# Patient Record
Sex: MALE | Race: WHITE | NOT HISPANIC OR LATINO
[De-identification: names, ages, dates, MRNs, and addresses within clinical notes are randomized per-mention and may not be internally consistent; named-entity substitution may affect disease eponyms.]

---

## 2020-02-21 ENCOUNTER — APPOINTMENT (OUTPATIENT)
Dept: UROLOGY | Facility: CLINIC | Age: 60
End: 2020-02-21
Payer: COMMERCIAL

## 2020-02-21 ENCOUNTER — TRANSCRIPTION ENCOUNTER (OUTPATIENT)
Age: 60
End: 2020-02-21

## 2020-02-21 VITALS
HEIGHT: 70 IN | SYSTOLIC BLOOD PRESSURE: 145 MMHG | TEMPERATURE: 98.1 F | DIASTOLIC BLOOD PRESSURE: 80 MMHG | BODY MASS INDEX: 25.77 KG/M2 | WEIGHT: 180 LBS

## 2020-02-21 DIAGNOSIS — Z00.00 ENCOUNTER FOR GENERAL ADULT MEDICAL EXAMINATION W/OUT ABNORMAL FINDINGS: ICD-10-CM

## 2020-02-21 LAB
BILIRUB UR QL STRIP: NORMAL
CLARITY UR: CLEAR
COLLECTION METHOD: NORMAL
GLUCOSE UR-MCNC: NORMAL
HCG UR QL: 0.2 EU/DL
HGB UR QL STRIP.AUTO: NORMAL
KETONES UR-MCNC: NORMAL
LEUKOCYTE ESTERASE UR QL STRIP: NORMAL
NITRITE UR QL STRIP: NORMAL
PH UR STRIP: 7
PROT UR STRIP-MCNC: NORMAL
SP GR UR STRIP: 1.01

## 2020-02-21 PROCEDURE — 81003 URINALYSIS AUTO W/O SCOPE: CPT | Mod: QW

## 2020-02-21 PROCEDURE — 99204 OFFICE O/P NEW MOD 45 MIN: CPT

## 2020-02-21 NOTE — HISTORY OF PRESENT ILLNESS
[FreeTextEntry1] : 59 year old male who comes to see me today as a new patient. He has a family history of CaP. He has little to no LUTS, and was last see by an Urologist in 2014 (Dr. Galvan). At that visit he also complained of ED and was treated with Viagra 100 mg. He apparently had a PSA level drawn 6 months ago which he recalls was "normal" but the result is not available today. \par \par This patient tells me that the Viagra has not been working well over the last year, but he does not accurately follow the directions of taking the medication on an empty stomach. The patient also tells me that he has developed Premature Ejaculation and asks if there is treatment for this problem.\par \par Patient also tells me that he is under the care of Dr. Soriano for his heart and for some cardiac calcification. He denies any angina or the use of any nitrate medication.\par \par UA dipstick negative today.

## 2020-02-21 NOTE — LETTER BODY
[Please see my note below.] : Please see my note below. [Consult Letter:] : I had the pleasure of evaluating your patient, [unfilled]. [Dear  ___] : Dear  [unfilled], [Consult Closing:] : Thank you very much for allowing me to participate in the care of this patient.  If you have any questions, please do not hesitate to contact me. [FreeTextEntry3] : Best Regards, \par \par Tiffanie Rea MD\par

## 2020-02-21 NOTE — PHYSICAL EXAM
[General Appearance - Well Developed] : well developed [General Appearance - Well Nourished] : well nourished [Normal Appearance] : normal appearance [Edema] : no peripheral edema [General Appearance - In No Acute Distress] : no acute distress [Well Groomed] : well groomed [Respiration, Rhythm And Depth] : normal respiratory rhythm and effort [Exaggerated Use Of Accessory Muscles For Inspiration] : no accessory muscle use [Abdomen Soft] : soft [Abdomen Hernia] : no hernia was discovered [Abdomen Tenderness] : non-tender [Costovertebral Angle Tenderness] : no ~M costovertebral angle tenderness [Penis Abnormality] : normal uncircumcised penis [Urethral Meatus] : meatus normal [Scrotum] : the scrotum was normal [Urinary Bladder Findings] : the bladder was normal on palpation [Epididymis] : the epididymides were normal [Prostate Enlargement] : the prostate was not enlarged [Testes Mass (___cm)] : there were no testicular masses [Testes Tenderness] : no tenderness of the testes [No Prostate Nodules] : no prostate nodules [Prostate Tenderness] : the prostate was not tender [Prostate Size ___ (0-4)] : prostate size [unfilled] (scale: 0-4) [Normal Station and Gait] : the gait and station were normal for the patient's age [] : no rash [No Focal Deficits] : no focal deficits [Oriented To Time, Place, And Person] : oriented to person, place, and time [Affect] : the affect was normal [Not Anxious] : not anxious [Mood] : the mood was normal

## 2020-02-21 NOTE — ASSESSMENT
[FreeTextEntry1] : We had an extensive discussion about the correct use of sildenafil, the alternative drugs available, further testing with Duples penile US testing and intracavernous injection (done in my office if indicated), and the association between CAD and ED as both are the result of small vessel disease. I recommended that he again try the sildenafil properly and I eRxd the medicine for him at [facility]. If this is not successful, then we will proceed as indicated. \par \par We also discussed his P.E. and I recommended that we delay further evaluation until we get the erections to function better. In the meanwhile, he can try second ejaculation. \par \par We also discussed his family history of CaP and I recommended that he see me yearly fir this.\par \par Tiffanie Rea MD\par

## 2020-02-24 LAB
PSA FREE FLD-MCNC: 31 %
PSA FREE SERPL-MCNC: 0.54 NG/ML
PSA SERPL-MCNC: 1.75 NG/ML
TESTOST SERPL-MCNC: 450 NG/DL

## 2020-11-02 ENCOUNTER — APPOINTMENT (OUTPATIENT)
Dept: UROLOGY | Facility: CLINIC | Age: 60
End: 2020-11-02
Payer: COMMERCIAL

## 2020-11-02 PROCEDURE — 99215 OFFICE O/P EST HI 40 MIN: CPT | Mod: 95

## 2020-11-02 NOTE — ASSESSMENT
[FreeTextEntry1] : We had an extensive discussion of the varied etiologies of ED and PE, including physical, medical and psychological. These issues appeared to be very difficult for the patient to discuss, and he and his wife were very appropriately concerned over them. They were dealing with feelings of guilt and blame and were both very respectful of and towards each other. They appeared to have moderately good insight into their situation, and both seemed motivated to work toward improving it. We discussed the optimal way to use the sildenafil and appropriate end point expectations. We also discussed the potential effects of EtOH on sexual performance at various levels of ingestion. We discussed second ejaculation for the ED as an immediate treatment that did not require any medication. We also discussed adding sertraline to his regimen and the basis for this addition. He declined this offer at this time, opting for non-medicine treatments. we also discussed sex therapy for the couple and potential benefits and they will explore this alternative also. Lastly we discussed the benefits of healthy diet, exercise, continued elimination of smoking and EtOH in moderation not only on ED and PE, but also on long term health on general.\par \par As for the urination issue, I confirmed that he was urinating normally and no further evaluation was indicated for this issue presently.\par \par We also discussed repeating the PSA and FU visit in 3 months. We ended the video portion of the visit at 1:43 PM. Tiffanei Rea MD\par

## 2020-11-02 NOTE — LETTER BODY
[Dear  ___] : Dear ~LYNDSEY, [Please see my note below.] : Please see my note below. [Consult Closing:] : Thank you very much for allowing me to participate in the care of this patient.  If you have any questions, please do not hesitate to contact me. [FreeTextEntry2] : Kj Gonzalez MD [FreeTextEntry1] : I had the pleasure of evaluation of your patient today via a telehealth virtual visit.\par  [FreeTextEntry3] : Best personal regards,\par \par Tiffanie\par

## 2020-11-02 NOTE — HISTORY OF PRESENT ILLNESS
[Other Location: e.g. School (Enter Location, City,State)___] : at [unfilled], at the time of the visit. [Other Location: e.g. Home (Enter Location, City,State)___] : at [unfilled] [Spouse] : spouse [Verbal consent obtained from patient] : the patient, [unfilled] [FreeTextEntry1] :  The patient-doctor relationship has been established in a face to face fashion via real time video/audio HIPAA compliant communication using telemedicine software. The patient's identity has been confirmed. The patient was previously emailed a copy of the telemedicine consent. They have had a chance to review and has now given verbal consent and has requested care to be assessed and treated through telemedicine and understands there maybe limitations in this process and they may need further follow up care in the office and or hospital settings. \par Verbal consent given on 11/2/2020, at 1:06 PM, by Angela Villegas.\par \par 59 year old male seen on 2/21/2020 as a new patient. He has a family history of CaP. He has little to no LUTS, and was last see by an Urologist in 2014 (Dr. Galvan). At that visit he also complained of ED and was treated with Viagra 100 mg. He apparently had a PSA level drawn 6 months prior which he recalls was "normal" but the result is not available. He had a PSA and TT done on 2/24/2020 which were both normal at 1.75 (31%) and 450 respectively. \par \par The patient is seen today with his wife for these issues. He tells me that he does not urinate unless his bladder is full and his wife is concerned over this. He denies any dysuria, frequency, urgency, hematuria, voids with a good stream and has nocturia x 0  2 if he drinks before sleeping. He feels like the sildenafil does not always work and his P.E. seems to be the biggest issue for him and his wife. He tells me that he has not smoked in about 1 year but continues to drink 2 glasses of whiskey or 1/2 bottle of wine on most nights. \par \par Patient also tells me that he is under the care of Dr. Soriano for his heart and for some cardiac calcification. He denies any angina or the use of any nitrate medication. HIs HTN and cholesterol issues are stabe on medicine, NKMA. \par \par  The patient denies fevers, chills, nausea and or vomiting and no unexplained weight loss. \par All pertinent parts of the patient PFSH (past medical, family and social histories), laboratory, radiological studies and physician notes were reviewed prior to starting the face to face portion of the telemedicine visit. Questionnaire results were discussed with patient.\par \par \par

## 2021-02-18 ENCOUNTER — NON-APPOINTMENT (OUTPATIENT)
Age: 61
End: 2021-02-18

## 2021-12-11 ENCOUNTER — NON-APPOINTMENT (OUTPATIENT)
Age: 61
End: 2021-12-11

## 2021-12-27 ENCOUNTER — RESULT CHARGE (OUTPATIENT)
Age: 61
End: 2021-12-27

## 2021-12-29 ENCOUNTER — APPOINTMENT (OUTPATIENT)
Dept: HEART AND VASCULAR | Facility: CLINIC | Age: 61
End: 2021-12-29

## 2021-12-29 VITALS
BODY MASS INDEX: 25.05 KG/M2 | TEMPERATURE: 97.3 F | WEIGHT: 175 LBS | DIASTOLIC BLOOD PRESSURE: 72 MMHG | HEART RATE: 53 BPM | HEIGHT: 70 IN | SYSTOLIC BLOOD PRESSURE: 142 MMHG

## 2022-01-12 ENCOUNTER — NON-APPOINTMENT (OUTPATIENT)
Age: 62
End: 2022-01-12

## 2022-01-12 ENCOUNTER — APPOINTMENT (OUTPATIENT)
Dept: HEART AND VASCULAR | Facility: CLINIC | Age: 62
End: 2022-01-12
Payer: COMMERCIAL

## 2022-01-12 VITALS
OXYGEN SATURATION: 94 % | DIASTOLIC BLOOD PRESSURE: 92 MMHG | BODY MASS INDEX: 25.91 KG/M2 | HEART RATE: 64 BPM | SYSTOLIC BLOOD PRESSURE: 148 MMHG | HEIGHT: 70 IN | WEIGHT: 181 LBS

## 2022-01-12 VITALS — SYSTOLIC BLOOD PRESSURE: 152 MMHG | DIASTOLIC BLOOD PRESSURE: 92 MMHG

## 2022-01-12 DIAGNOSIS — F52.4 PREMATURE EJACULATION: ICD-10-CM

## 2022-01-12 DIAGNOSIS — Z78.9 OTHER SPECIFIED HEALTH STATUS: ICD-10-CM

## 2022-01-12 DIAGNOSIS — Z87.891 PERSONAL HISTORY OF NICOTINE DEPENDENCE: ICD-10-CM

## 2022-01-12 PROCEDURE — 99204 OFFICE O/P NEW MOD 45 MIN: CPT | Mod: 25

## 2022-01-12 PROCEDURE — 93000 ELECTROCARDIOGRAM COMPLETE: CPT

## 2022-01-12 NOTE — HISTORY OF PRESENT ILLNESS
[FreeTextEntry1] : Mr. Villegas presents for initial evaluation and management of HTN, erectile dysfunction, dyslipidemia, and PAD.  He was previously followed by another cardiologist, Kathy Soriano MD who retired.  He has a family history of abdominal aortic aneurysm.  He has been getting regular screening sonograms of his carotid arteries, lower extremity peripheral arteries, and abdominal aorta.  He denies chest pain and had HOLLY to several flights of stairs.

## 2022-01-12 NOTE — REASON FOR VISIT
[Other: ____] : [unfilled] [FreeTextEntry1] : Diagnostic Tests:\par -------------------------------------------\par ECG:\[facility] 01/12/22: sinus bradycardia at 58 bpm, otherwise normal.  \par -------------------------------------------\par Echo:\[facility] 01/15/18: EF 64%, grade I diastolic dysfunction. \par -------------------------------------------\par Monitors:\[facility] 11/08/17: Holter: HR 46/64/103, first degree AV block, no pauses, no AF, 6 beats SVT, rare APCs/VPCs.  \par -------------------------------------------\par Carotid arteries:\par 11/08/17: sono: normal study.  \par 06/10/16: sono: mild atherosclerosis.\par -------------------------------------------\par Aorta-Iliac arteries:\par 11/08/17: sono: normal study. \par 06/10/16: sono: no AAA, mild atherosclerosis. \par --------------------------------------------\par Sleep studies:\par 11/13/17: no sleep apnea.

## 2022-01-12 NOTE — ASSESSMENT
[FreeTextEntry1] : 1. HTN: BP not at ACC/AHA 2017 guideline target: has been taking ramipril 2.5mg qhs\par         - change ramipril from 2.5mg po qhs to 5mg po qam \par         - if BP remains above target next visit will up titrate anti-HTN regimen \par         - patient will provide copy of recent lab work from PMD's office for my review \par         - check lab work next visit\par         - will send for an echocardiogram to rule out hypertensive heart disease\par \par 2. Dyslipidemia: LDL 63 (02/19/21)\par        - continue atorvastatin 40mg po qd\par        - discussed therapeutic lifestyle changes to promote improved lipid metabolism  \par        - check lab work next visit\par \par 3. PAD: per history, details unknown\par        - will send for a bilateral lower extremity arterial sonogram to assess extend of PAD\par \par 4. Carotid artery atherosclerosis:\par        - will send for a bilateral carotid artery sonogram to assess the extent of carotid atherosclerosis\par \par 5. r/o Abdominal aortic aneurysm:  + FHx of AAA\par        - will send for an aorta-iliac artery sonogram to rule out AAA\par \par \par An ECG was obtained to evaluate heart rhythm and screen for any underlying cardiac abnormalities.

## 2022-02-02 ENCOUNTER — APPOINTMENT (OUTPATIENT)
Dept: HEART AND VASCULAR | Facility: CLINIC | Age: 62
End: 2022-02-02
Payer: COMMERCIAL

## 2022-02-02 DIAGNOSIS — Z13.6 ENCOUNTER FOR SCREENING FOR CARDIOVASCULAR DISORDERS: ICD-10-CM

## 2022-02-02 DIAGNOSIS — Z86.79 PERSONAL HISTORY OF OTHER DISEASES OF THE CIRCULATORY SYSTEM: ICD-10-CM

## 2022-02-02 PROCEDURE — 93306 TTE W/DOPPLER COMPLETE: CPT

## 2022-02-02 PROCEDURE — 93880 EXTRACRANIAL BILAT STUDY: CPT

## 2022-02-02 PROCEDURE — 93925 LOWER EXTREMITY STUDY: CPT

## 2022-02-03 ENCOUNTER — NON-APPOINTMENT (OUTPATIENT)
Age: 62
End: 2022-02-03

## 2022-02-03 PROBLEM — Z86.79 HISTORY OF PERIPHERAL ARTERIAL DISEASE: Status: RESOLVED | Noted: 2022-01-12 | Resolved: 2022-02-03

## 2022-02-03 PROBLEM — Z13.6 SCREENING FOR AAA (AORTIC ABDOMINAL ANEURYSM): Status: RESOLVED | Noted: 2022-01-12 | Resolved: 2022-02-03

## 2022-02-07 ENCOUNTER — TRANSCRIPTION ENCOUNTER (OUTPATIENT)
Age: 62
End: 2022-02-07

## 2022-05-26 ENCOUNTER — NON-APPOINTMENT (OUTPATIENT)
Age: 62
End: 2022-05-26

## 2022-05-26 ENCOUNTER — TRANSCRIPTION ENCOUNTER (OUTPATIENT)
Age: 62
End: 2022-05-26

## 2022-05-27 ENCOUNTER — TRANSCRIPTION ENCOUNTER (OUTPATIENT)
Age: 62
End: 2022-05-27

## 2022-06-02 ENCOUNTER — APPOINTMENT (OUTPATIENT)
Dept: UROLOGY | Facility: CLINIC | Age: 62
End: 2022-06-02
Payer: COMMERCIAL

## 2022-06-02 VITALS
BODY MASS INDEX: 25.77 KG/M2 | WEIGHT: 180 LBS | HEART RATE: 61 BPM | TEMPERATURE: 97.9 F | DIASTOLIC BLOOD PRESSURE: 71 MMHG | HEIGHT: 70 IN | SYSTOLIC BLOOD PRESSURE: 119 MMHG

## 2022-06-02 LAB
BILIRUB UR QL STRIP: NORMAL
CLARITY UR: CLEAR
COLLECTION METHOD: NORMAL
GLUCOSE UR-MCNC: NORMAL
HCG UR QL: 0.2 EU/DL
HGB UR QL STRIP.AUTO: NORMAL
KETONES UR-MCNC: NORMAL
LEUKOCYTE ESTERASE UR QL STRIP: NORMAL
NITRITE UR QL STRIP: NORMAL
PH UR STRIP: 6.5
PROT UR STRIP-MCNC: NORMAL
SP GR UR STRIP: 1

## 2022-06-02 PROCEDURE — 99214 OFFICE O/P EST MOD 30 MIN: CPT | Mod: 25

## 2022-06-02 PROCEDURE — 51798 US URINE CAPACITY MEASURE: CPT

## 2022-06-02 PROCEDURE — 81003 URINALYSIS AUTO W/O SCOPE: CPT | Mod: QW

## 2022-06-02 NOTE — PHYSICAL EXAM
[General Appearance - Well Developed] : well developed [General Appearance - Well Nourished] : well nourished [Normal Appearance] : normal appearance [Well Groomed] : well groomed [General Appearance - In No Acute Distress] : no acute distress [Abdomen Soft] : soft [Abdomen Tenderness] : non-tender [Costovertebral Angle Tenderness] : no ~M costovertebral angle tenderness [Urethral Meatus] : meatus normal [Urinary Bladder Findings] : the bladder was normal on palpation [Scrotum] : the scrotum was normal [Testes Mass (___cm)] : there were no testicular masses [Prostate Tenderness] : the prostate was not tender [No Prostate Nodules] : no prostate nodules [Prostate Size ___ (0-4)] : prostate size [unfilled] (scale: 0-4) [Edema] : no peripheral edema [] : no respiratory distress [Respiration, Rhythm And Depth] : normal respiratory rhythm and effort [Exaggerated Use Of Accessory Muscles For Inspiration] : no accessory muscle use [Oriented To Time, Place, And Person] : oriented to person, place, and time [Affect] : the affect was normal [Mood] : the mood was normal [Not Anxious] : not anxious [Normal Station and Gait] : the gait and station were normal for the patient's age [No Focal Deficits] : no focal deficits [FreeTextEntry1] : 2 cm tender mass above the right testis, consistent with spermatocele. Left testis surgically absent from prior torsion.

## 2022-06-02 NOTE — ASSESSMENT
[FreeTextEntry1] : We discussed the patient's response to sildenafil citrate and I [facility].  We also discussed some other options which might enhance his sexual response and I recommended that he try tadalafil 5 mg daily.  After reviewing the indications risks alternatives and chances for success on this medicine, at his request I did prescribe this for him through [facility] pharmacy mail-order.  For now he will use these interchangeably.  We discussed appropriate ways to take the medicine to optimize the effects.\par \par We also discussed his minimal lower urinary tract symptoms and the need for yearly prostatic screening including PSA and TRICE.  Both of these were performed today.  If the results of the PSA remains stable, then we will follow-up in 1 year.  In the meanwhile if he finds that the above treatment for his ED is not successful, then we will revisit this sooner by means of JUAN MIGUEL visit.\par \par As for the mild and chronic tenderness above the right testis and apparent spermatocele, we will continue to follow this for any changes and consider scrotal ultrasound in the future. Tiffanie Rea MD\par

## 2022-06-02 NOTE — HISTORY OF PRESENT ILLNESS
[FreeTextEntry1] : 63 YO M seen on 2/21/2020 as a new patient. He has a family history of CaP. He has little to no LUTS, and was last see by an Urologist in 2014 (Dr. Galvan). At that visit he also complained of ED and was treated with Viagra 100 mg. He apparently had a PSA level drawn 6 months prior which he recalls was "normal" but the result is not available. He had a PSA and TT done on 2/24/2020 which were both normal at 1.75 (31%) and 450 respectively. \par \par PAtient seen 11/2/2020 via [facility] with his wife for these issues. He tells me that he does not urinate unless his bladder is full and his wife is concerned over this. He denies any dysuria, frequency, urgency, hematuria, voids with a good stream and has nocturia x 0  2 if he drinks before sleeping. He feels like the sildenafil does not always work and his P.E. seems to be the biggest issue for him and his wife. He tells me that he has not smoked in about 1 year but continues to drink 2 glasses of whiskey or 1/2 bottle of wine on most nights. \par We had an extensive discussion of the varied etiologies of ED and PE, including physical, medical and psychological. These issues appeared to be very difficult for the patient to discuss, and he and his wife were very appropriately concerned over them. They were dealing with feelings of guilt and blame and were both very respectful of and towards each other. They appeared to have moderately good insight into their situation, and both seemed motivated to work toward improving it. We discussed the optimal way to use the sildenafil and appropriate end point expectations. We also discussed the potential effects of EtOH on sexual performance at various levels of ingestion. We discussed second ejaculation for the ED as an immediate treatment that did not require any medication. We also discussed adding sertraline to his regimen and the basis for this addition. He declined this offer at this time, opting for non-medicine treatments. we also discussed sex therapy for the couple and potential benefits and they will explore this alternative also. Lastly we discussed the benefits of healthy diet, exercise, continued elimination of smoking and EtOH in moderation not only on ED and PE, but also on long term health on general.\par As for the urination issue, I confirmed that he was urinating normally and no further evaluation was indicated for this issue presently.\par We also discussed repeating the PSA and FU visit in 3 months. \par \par Patient seen TODAY 6/2/2022 to reassess and reorder sildenafil. He told me that while he sildenafil works, he has ad trouble with timing when he takes it and when he has sexual activity. He has not tried any other oral medications for ED. No appreciable LUTS, nocturia, hematuria or dysuria. He has mild discomfort on palpation of the right testis chronically.\par UA negative\par IPSS 0\par JOAQUIN 15\par HIRAL 1\par PVR 51\par \par Patient also tells me that he is under the care of Dr. Soriano for his heart and for some cardiac calcification. He denies any angina or the use of any nitrate medication. HIs HTN and cholesterol issues are stable on medicine, NKMA. \par  The patient denies fevers, chills, nausea and or vomiting and no unexplained weight loss. \par All pertinent parts of the patient PFSH (past medical, family and social histories), laboratory, radiological studies and physician notes were reviewed prior to starting the face to face portion of the telemedicine visit. Questionnaire results were discussed with patient.\par \par \par

## 2022-06-02 NOTE — LETTER BODY
[Dear  ___] : Dear  [unfilled], [Courtesy Letter:] : I had the pleasure of seeing your patient, [unfilled], in my office today. [Please see my note below.] : Please see my note below. [Consult Closing:] : Thank you very much for allowing me to participate in the care of this patient.  If you have any questions, please do not hesitate to contact me. [FreeTextEntry3] : Best Regards, \par \par Tiffanie Rea MD\par

## 2022-06-03 ENCOUNTER — NON-APPOINTMENT (OUTPATIENT)
Age: 62
End: 2022-06-03

## 2022-06-03 LAB — PSA SERPL-MCNC: 2.62 NG/ML

## 2022-06-15 ENCOUNTER — APPOINTMENT (OUTPATIENT)
Dept: HEART AND VASCULAR | Facility: CLINIC | Age: 62
End: 2022-06-15
Payer: COMMERCIAL

## 2022-06-15 VITALS
HEIGHT: 70 IN | BODY MASS INDEX: 25.62 KG/M2 | TEMPERATURE: 97.8 F | WEIGHT: 179 LBS | DIASTOLIC BLOOD PRESSURE: 68 MMHG | SYSTOLIC BLOOD PRESSURE: 114 MMHG | OXYGEN SATURATION: 97 % | HEART RATE: 62 BPM

## 2022-06-15 PROCEDURE — 99214 OFFICE O/P EST MOD 30 MIN: CPT

## 2022-06-15 NOTE — HISTORY OF PRESENT ILLNESS
[FreeTextEntry1] : Mr. Villegas presents for follow up and management of HTN, erectile dysfunction, and dyslipidemia.  He was previously followed by another cardiologist, Kathy Soriano MD who retired.  He has a family history of abdominal aortic aneurysm.  He has been getting regular screening sonograms of his carotid arteries, lower extremity peripheral arteries, and abdominal aorta with his prior cardiologist.  He denies chest pain and had HOLLY to several flights of stairs. On 02/02/22 he had an echocardiogram which revealed an EF of 68% and was a normal study.

## 2022-06-15 NOTE — REASON FOR VISIT
[FreeTextEntry1] : Diagnostic Tests:\par -------------------------------------------\par ECG:\[facility] 01/12/22: sinus bradycardia at 58 bpm, otherwise normal.  \par -------------------------------------------\par Echo:\[facility] 02/02/22: EF 68%, normal study. \par 01/15/18: EF 64%, grade I diastolic dysfunction. \par -------------------------------------------\par Monitors:\[facility] 11/08/17: Holter: HR 46/64/103, first degree AV block, no pauses, no AF, 6 beats SVT, rare APCs/VPCs.  \par -------------------------------------------\par Carotid arteries:\par 02/02/22: sono: 1-19% b/l prox ICA stenosis. \par 11/08/17: sono: normal study.  \par 06/10/16: sono: mild atherosclerosis.\par -------------------------------------------\par Aorta-Iliac arteries:\par 11/08/17: sono: normal study. \par 06/10/16: sono: no AAA, mild atherosclerosis. \par --------------------------------------------\par Sleep studies:\par 11/13/17: no sleep apnea.  \par --------------------------------------------\par Lower extremity arteries:\par 02/02/22: sono: normal bilateral lower extremity arterial system.

## 2022-06-15 NOTE — ASSESSMENT
[FreeTextEntry1] : 1. HTN: BP at ACC/AHA 2017 guideline target: has been taking ramipril 2.5mg qhs\par         - continue ramipril 5mg po qam \par         - check lab work today \par \par 2. Dyslipidemia: LDL 63 (02/19/21)\par        - continue atorvastatin 40mg po qd\par        - discussed therapeutic lifestyle changes to promote improved lipid metabolism  \par        - check lab work today\par \par 3. Carotid artery atherosclerosis:\par        - will send for a bilateral carotid artery sonogram to assess the extent of carotid atherosclerosis\par \par 4. r/o Abdominal aortic aneurysm:  + FHx of AAA: s/p normal sonogram (11/18/17)\par        - no additional work up required at this time \par \par

## 2022-06-16 LAB
24R-OH-CALCIDIOL SERPL-MCNC: 68.4 PG/ML
ALBUMIN SERPL ELPH-MCNC: 5 G/DL
ALP BLD-CCNC: 86 U/L
ALT SERPL-CCNC: 19 U/L
ANION GAP SERPL CALC-SCNC: 13 MMOL/L
AST SERPL-CCNC: 26 U/L
BASOPHILS # BLD AUTO: 0.05 K/UL
BASOPHILS NFR BLD AUTO: 1 %
BILIRUB SERPL-MCNC: 0.8 MG/DL
BUN SERPL-MCNC: 11 MG/DL
CALCIUM SERPL-MCNC: 10.2 MG/DL
CHLORIDE SERPL-SCNC: 103 MMOL/L
CHOLEST SERPL-MCNC: 160 MG/DL
CO2 SERPL-SCNC: 26 MMOL/L
CREAT SERPL-MCNC: 0.9 MG/DL
CRP SERPL HS-MCNC: 0.49 MG/L
EGFR: 97 ML/MIN/1.73M2
EOSINOPHIL # BLD AUTO: 0.14 K/UL
EOSINOPHIL NFR BLD AUTO: 2.8 %
ESTIMATED AVERAGE GLUCOSE: 100 MG/DL
FOLATE SERPL-MCNC: >20 NG/ML
GLUCOSE SERPL-MCNC: 102 MG/DL
HBA1C MFR BLD HPLC: 5.1 %
HCT VFR BLD CALC: 45.1 %
HDLC SERPL-MCNC: 63 MG/DL
HGB BLD-MCNC: 14.9 G/DL
IMM GRANULOCYTES NFR BLD AUTO: 0.2 %
IRON SERPL-MCNC: 90 UG/DL
LDLC SERPL CALC-MCNC: 85 MG/DL
LDLC SERPL DIRECT ASSAY-MCNC: 84 MG/DL
LYMPHOCYTES # BLD AUTO: 1.81 K/UL
LYMPHOCYTES NFR BLD AUTO: 36 %
MAN DIFF?: NORMAL
MCHC RBC-ENTMCNC: 31.2 PG
MCHC RBC-ENTMCNC: 33 GM/DL
MCV RBC AUTO: 94.5 FL
MONOCYTES # BLD AUTO: 0.46 K/UL
MONOCYTES NFR BLD AUTO: 9.1 %
NEUTROPHILS # BLD AUTO: 2.56 K/UL
NEUTROPHILS NFR BLD AUTO: 50.9 %
NONHDLC SERPL-MCNC: 98 MG/DL
NT-PROBNP SERPL-MCNC: 106 PG/ML
PLATELET # BLD AUTO: 199 K/UL
POTASSIUM SERPL-SCNC: 4.5 MMOL/L
PROT SERPL-MCNC: 7.6 G/DL
RBC # BLD: 4.77 M/UL
RBC # FLD: 14.1 %
SODIUM SERPL-SCNC: 142 MMOL/L
TRIGL SERPL-MCNC: 65 MG/DL
TSH SERPL-ACNC: 2.08 UIU/ML
VIT B12 SERPL-MCNC: 451 PG/ML
WBC # FLD AUTO: 5.03 K/UL

## 2022-06-17 ENCOUNTER — TRANSCRIPTION ENCOUNTER (OUTPATIENT)
Age: 62
End: 2022-06-17

## 2022-06-17 LAB — APO LP(A) SERPL-MCNC: 94.1 NMOL/L

## 2022-06-23 ENCOUNTER — TRANSCRIPTION ENCOUNTER (OUTPATIENT)
Age: 62
End: 2022-06-23

## 2022-12-02 ENCOUNTER — NON-APPOINTMENT (OUTPATIENT)
Age: 62
End: 2022-12-02

## 2022-12-19 ENCOUNTER — APPOINTMENT (OUTPATIENT)
Dept: HEART AND VASCULAR | Facility: CLINIC | Age: 62
End: 2022-12-19

## 2022-12-19 ENCOUNTER — NON-APPOINTMENT (OUTPATIENT)
Age: 62
End: 2022-12-19

## 2022-12-19 VITALS
OXYGEN SATURATION: 97 % | SYSTOLIC BLOOD PRESSURE: 116 MMHG | DIASTOLIC BLOOD PRESSURE: 73 MMHG | HEART RATE: 69 BPM | HEIGHT: 70 IN | WEIGHT: 181.2 LBS | BODY MASS INDEX: 25.94 KG/M2

## 2022-12-19 PROCEDURE — 99214 OFFICE O/P EST MOD 30 MIN: CPT | Mod: 25

## 2022-12-19 PROCEDURE — 93000 ELECTROCARDIOGRAM COMPLETE: CPT

## 2022-12-19 NOTE — HISTORY OF PRESENT ILLNESS
[FreeTextEntry1] : Mr. Villegas presents for follow up and management of HTN, erectile dysfunction, and dyslipidemia.  He was previously followed by another cardiologist, Kathy Soriano MD who retired.  He has a family history of abdominal aortic aneurysm.  He has been getting regular screening sonograms of his carotid arteries, lower extremity peripheral arteries, and abdominal aorta with his prior cardiologist. On 02/02/22 he had an echocardiogram which revealed an EF of 68% and was a normal study.  On, 12/02/22, he emailed to inform me that he will be flying to [facility] urgently to attend to his mother's illness.  She is 92 years old but has progressive dementia.   At present, he feels well.

## 2022-12-19 NOTE — REASON FOR VISIT
[FreeTextEntry1] : Diagnostic Tests:\par -------------------------------------------\par ECG:\par 12/19/22: sinus rhythm, normal ECG. \par 01/12/22: sinus bradycardia at 58 bpm, otherwise normal.  \par -------------------------------------------\par Echo:\par 02/02/22: EF 68%, normal study. \par 01/15/18: EF 64%, grade I diastolic dysfunction. \par -------------------------------------------\par Monitors:\par 11/08/17: Holter: HR 46/64/103, first degree AV block, no pauses, no AF, 6 beats SVT, rare APCs/VPCs.  \par -------------------------------------------\par Carotid arteries:\par 02/02/22: sono: 1-19% b/l prox ICA stenosis. \par 11/08/17: sono: normal study.  \par 06/10/16: sono: mild atherosclerosis.\par -------------------------------------------\par Aorta-Iliac arteries:\par 11/08/17: sono: normal study. \par 06/10/16: sono: no AAA, mild atherosclerosis. \par --------------------------------------------\par Sleep studies:\par 11/13/17: no sleep apnea.  \par --------------------------------------------\par Lower extremity arteries:\par 02/02/22: sono: normal bilateral lower extremity arterial system.

## 2022-12-19 NOTE — ASSESSMENT
[FreeTextEntry1] : 1. HTN: BP at ACC/AHA 2017 guideline target: has been taking ramipril 2.5mg qhs\par         - continue ramipril 5mg po qam \par \par 2. Dyslipidemia: LDL 85 (06/15/22):\par        - continue atorvastatin 40mg po qd\par        - discussed therapeutic lifestyle changes to promote improved lipid metabolism  \par \par 3. Carotid artery atherosclerosis: mild bilateral ICA atherosclerosis: \par        - will pursue medical management\par \par 4. r/o Abdominal aortic aneurysm:  + FHx of AAA: s/p normal sonogram (11/18/17)\par        - no additional work up required at this time \par \par

## 2023-04-18 ENCOUNTER — NON-APPOINTMENT (OUTPATIENT)
Age: 63
End: 2023-04-18

## 2023-04-19 ENCOUNTER — APPOINTMENT (OUTPATIENT)
Dept: HEART AND VASCULAR | Facility: CLINIC | Age: 63
End: 2023-04-19
Payer: COMMERCIAL

## 2023-04-19 VITALS
BODY MASS INDEX: 26.63 KG/M2 | HEART RATE: 74 BPM | DIASTOLIC BLOOD PRESSURE: 72 MMHG | SYSTOLIC BLOOD PRESSURE: 118 MMHG | WEIGHT: 186 LBS | HEIGHT: 70 IN

## 2023-04-19 VITALS
BODY MASS INDEX: 26.63 KG/M2 | SYSTOLIC BLOOD PRESSURE: 118 MMHG | HEIGHT: 70 IN | DIASTOLIC BLOOD PRESSURE: 75 MMHG | HEART RATE: 74 BPM | WEIGHT: 186 LBS

## 2023-04-19 PROCEDURE — 99214 OFFICE O/P EST MOD 30 MIN: CPT

## 2023-04-19 NOTE — HISTORY OF PRESENT ILLNESS
[FreeTextEntry1] : Mr. Villegas presents for follow up and management of HTN, erectile dysfunction, and dyslipidemia.  He was previously followed by another cardiologist, Kathy Soriano MD who retired.  He has a family history of abdominal aortic aneurysm.  He has been getting regular screening sonograms of his carotid arteries, lower extremity peripheral arteries, and abdominal aorta with his prior cardiologist. On 02/02/22 he had an echocardiogram which revealed an EF of 68% and was a normal study.  On, 12/02/22, he emailed to inform me that he will be flying to [facility] urgently to attend to his mother's illness. She is 92 years old but has progressive dementia.  At present, he feels well and denies chest pain, shortness of breath, lower extremity edema or PND.

## 2023-04-19 NOTE — REASON FOR VISIT
[Other: ____] : [unfilled] [FreeTextEntry1] : Diagnostic Tests:\par -------------------------------------------\par ECG:\par 12/19/22: sinus rhythm, normal ECG. \par 01/12/22: sinus bradycardia at 58 bpm, otherwise normal.  \par -------------------------------------------\par Echo:\par 02/02/22: EF 68%, normal study. \par 01/15/18: EF 64%, grade I diastolic dysfunction. \par -------------------------------------------\par Monitors:\par 11/08/17: Holter: HR 46/64/103, first degree AV block, no pauses, no AF, 6 beats SVT, rare APCs/VPCs.  \par -------------------------------------------\par Carotid arteries:\par 02/02/22: sono: 1-19% b/l prox ICA stenosis. \par 11/08/17: sono: normal study.  \par 06/10/16: sono: mild atherosclerosis.\par -------------------------------------------\par Aorta-Iliac arteries:\par 11/08/17: sono: normal study. \par 06/10/16: sono: no AAA, mild atherosclerosis. \par --------------------------------------------\par Sleep studies:\par 11/13/17: no sleep apnea.  \par --------------------------------------------\par Lower extremity arteries:\par 02/02/22: sono: normal bilateral lower extremity arterial system.

## 2023-04-19 NOTE — PHYSICAL EXAM
[Well Developed] : well developed [Well Nourished] : well nourished [No Acute Distress] : no acute distress [Normal Conjunctiva] : normal conjunctiva [Normal Venous Pressure] : normal venous pressure [No Carotid Bruit] : no carotid bruit [Normal S1, S2] : normal S1, S2 [No Murmur] : no murmur [No Rub] : no rub [No Gallop] : no gallop [Good Air Entry] : good air entry [Clear Lung Fields] : clear lung fields [No Respiratory Distress] : no respiratory distress  [Soft] : abdomen soft [Non Tender] : non-tender [No Masses/organomegaly] : no masses/organomegaly [Normal Bowel Sounds] : normal bowel sounds [Normal Gait] : normal gait [No Edema] : no edema [No Cyanosis] : no cyanosis [No Clubbing] : no clubbing [No Varicosities] : no varicosities [No Rash] : no rash [No Skin Lesions] : no skin lesions [Moves all extremities] : moves all extremities [No Focal Deficits] : no focal deficits [Normal Speech] : normal speech [Alert and Oriented] : alert and oriented [Normal memory] : normal memory [de-identified] : + perioral vitiligo

## 2023-04-19 NOTE — ASSESSMENT
[FreeTextEntry1] : 1. HTN: BP at ACC/AHA 2017 guideline target: has been taking ramipril 5mg qhs\par         - continue ramipril 5mg po q am \par \par 2. Dyslipidemia: LDL 85, lipoprotein a 94.1 (06/15/22):\par        - continue atorvastatin 40mg po qd\par        - discussed therapeutic lifestyle changes to promote improved lipid metabolism  \par \par 3. Carotid artery atherosclerosis: mild bilateral ICA atherosclerosis: \par        - will pursue medical management\par \par 4. r/o Abdominal aortic aneurysm:  + FHx of AAA: s/p normal sonogram (11/18/17)\par        - no additional work up required at this time \par \par \par The patient was seen and examined with a cardiology fellow as part of their longitudinal ambulatory cardiology training experience.  Permission was obtained at the time of the visit from the patient for the fellow's participation. \par \par

## 2023-04-20 LAB
ALBUMIN SERPL ELPH-MCNC: 4.7 G/DL
ALP BLD-CCNC: 82 U/L
ALT SERPL-CCNC: 21 U/L
ANION GAP SERPL CALC-SCNC: 13 MMOL/L
AST SERPL-CCNC: 28 U/L
BASOPHILS # BLD AUTO: 0.02 K/UL
BASOPHILS NFR BLD AUTO: 0.3 %
BILIRUB SERPL-MCNC: 0.4 MG/DL
BUN SERPL-MCNC: 18 MG/DL
CALCIUM SERPL-MCNC: 10.1 MG/DL
CHLORIDE SERPL-SCNC: 105 MMOL/L
CHOLEST SERPL-MCNC: 168 MG/DL
CO2 SERPL-SCNC: 26 MMOL/L
CREAT SERPL-MCNC: 0.95 MG/DL
EGFR: 91 ML/MIN/1.73M2
EOSINOPHIL # BLD AUTO: 0.15 K/UL
EOSINOPHIL NFR BLD AUTO: 2.3 %
ESTIMATED AVERAGE GLUCOSE: 114 MG/DL
GLUCOSE SERPL-MCNC: 135 MG/DL
HBA1C MFR BLD HPLC: 5.6 %
HCT VFR BLD CALC: 48.2 %
HDLC SERPL-MCNC: 65 MG/DL
HGB BLD-MCNC: 15.8 G/DL
IMM GRANULOCYTES NFR BLD AUTO: 0.2 %
LDLC SERPL DIRECT ASSAY-MCNC: 92 MG/DL
LYMPHOCYTES # BLD AUTO: 1.61 K/UL
LYMPHOCYTES NFR BLD AUTO: 24.6 %
MAN DIFF?: NORMAL
MCHC RBC-ENTMCNC: 31.7 PG
MCHC RBC-ENTMCNC: 32.8 GM/DL
MCV RBC AUTO: 96.8 FL
MONOCYTES # BLD AUTO: 0.57 K/UL
MONOCYTES NFR BLD AUTO: 8.7 %
NEUTROPHILS # BLD AUTO: 4.18 K/UL
NEUTROPHILS NFR BLD AUTO: 63.9 %
PLATELET # BLD AUTO: 186 K/UL
POTASSIUM SERPL-SCNC: 4.7 MMOL/L
PROT SERPL-MCNC: 7.5 G/DL
RBC # BLD: 4.98 M/UL
RBC # FLD: 14.5 %
SODIUM SERPL-SCNC: 144 MMOL/L
TRIGL SERPL-MCNC: 70 MG/DL
TSH SERPL-ACNC: 2.62 UIU/ML
WBC # FLD AUTO: 6.54 K/UL

## 2023-06-16 ENCOUNTER — APPOINTMENT (OUTPATIENT)
Dept: UROLOGY | Facility: CLINIC | Age: 63
End: 2023-06-16
Payer: COMMERCIAL

## 2023-06-16 VITALS
OXYGEN SATURATION: 99 % | HEIGHT: 70 IN | SYSTOLIC BLOOD PRESSURE: 141 MMHG | DIASTOLIC BLOOD PRESSURE: 88 MMHG | TEMPERATURE: 97.2 F | HEART RATE: 65 BPM | WEIGHT: 315 LBS | BODY MASS INDEX: 45.1 KG/M2

## 2023-06-16 VITALS
SYSTOLIC BLOOD PRESSURE: 138 MMHG | OXYGEN SATURATION: 98 % | DIASTOLIC BLOOD PRESSURE: 88 MMHG | TEMPERATURE: 97.3 F | HEART RATE: 60 BPM

## 2023-06-16 DIAGNOSIS — N40.1 BENIGN PROSTATIC HYPERPLASIA WITH LOWER URINARY TRACT SYMPMS: ICD-10-CM

## 2023-06-16 DIAGNOSIS — N13.8 BENIGN PROSTATIC HYPERPLASIA WITH LOWER URINARY TRACT SYMPMS: ICD-10-CM

## 2023-06-16 DIAGNOSIS — N52.01 ERECTILE DYSFUNCTION DUE TO ARTERIAL INSUFFICIENCY: ICD-10-CM

## 2023-06-16 LAB
BILIRUB UR QL STRIP: NORMAL
CLARITY UR: CLEAR
COLLECTION METHOD: NORMAL
GLUCOSE UR-MCNC: NORMAL
HCG UR QL: 0.2 EU/DL
HGB UR QL STRIP.AUTO: NORMAL
KETONES UR-MCNC: NORMAL
LEUKOCYTE ESTERASE UR QL STRIP: NORMAL
NITRITE UR QL STRIP: NORMAL
PH UR STRIP: 6
PROT UR STRIP-MCNC: NORMAL
SP GR UR STRIP: 1.01

## 2023-06-16 PROCEDURE — 51798 US URINE CAPACITY MEASURE: CPT

## 2023-06-16 PROCEDURE — 81003 URINALYSIS AUTO W/O SCOPE: CPT | Mod: QW

## 2023-06-16 PROCEDURE — 99214 OFFICE O/P EST MOD 30 MIN: CPT | Mod: 25

## 2023-06-16 NOTE — ASSESSMENT
[FreeTextEntry1] : We discussed his ED and response to individual medications. I recommended that he try the off label use of tadalafil 5 mg daily and add sildenafil 50 - 100 mg PRN before sexual activity. We reviewed the indications, risks, alternatives and chances for success with this approach. He is interested in trying this and medications were renewed at [facility]. \par \par As for his LUTS, these are stable and no further testing or treatment is indicated now. The right testis and spermatocele are also stable.\par \par PSA was sent today and if stable, then FU 1 year. Tiffanie Rea MD\par

## 2023-06-16 NOTE — HISTORY OF PRESENT ILLNESS
[FreeTextEntry1] : 62 YO M seen on 2/21/2020 as a new patient. He has a family history of CaP. He has little to no LUTS, and was last see by an Urologist in 2014 (Dr. Galvan). At that visit he also complained of ED and was treated with Viagra 100 mg. He apparently had a PSA level drawn 6 months prior which he recalls was "normal" but the result is not available. He had a PSA and TT done on 2/24/2020 which were both normal at 1.75 (31%) and 450 respectively. \par \par PAtient seen 11/2/2020 via [facility] with his wife for these issues. He tells me that he does not urinate unless his bladder is full and his wife is concerned over this. He denies any dysuria, frequency, urgency, hematuria, voids with a good stream and has nocturia x 0  2 if he drinks before sleeping. He feels like the sildenafil does not always work and his P.E. seems to be the biggest issue for him and his wife. He tells me that he has not smoked in about 1 year but continues to drink 2 glasses of whiskey or 1/2 bottle of wine on most nights. \par We had an extensive discussion of the varied etiologies of ED and PE, including physical, medical and psychological. These issues appeared to be very difficult for the patient to discuss, and he and his wife were very appropriately concerned over them. They were dealing with feelings of guilt and blame and were both very respectful of and towards each other. They appeared to have moderately good insight into their situation, and both seemed motivated to work toward improving it. We discussed the optimal way to use the sildenafil and appropriate end point expectations. We also discussed the potential effects of EtOH on sexual performance at various levels of ingestion. We discussed second ejaculation for the ED as an immediate treatment that did not require any medication. We also discussed adding sertraline to his regimen and the basis for this addition. He declined this offer at this time, opting for non-medicine treatments. we also discussed sex therapy for the couple and potential benefits and they will explore this alternative also. Lastly we discussed the benefits of healthy diet, exercise, continued elimination of smoking and EtOH in moderation not only on ED and PE, but also on long term health on general.\par As for the urination issue, I confirmed that he was urinating normally and no further evaluation was indicated for this issue presently.\par We also discussed repeating the PSA and FU visit in 3 months. \par \par Patient seen  6/2/2022 to reassess and reorder sildenafil. He told me that while he sildenafil works, he has had trouble with timing when he takes it and when he has sexual activity. He has not tried any other oral medications for ED. No appreciable LUTS, nocturia, hematuria or dysuria. He has mild discomfort on palpation of the right testis chronically.\par UA negative\par IPSS 0\par JOAQUIN 15\par HIRAL 1\par PVR 51\par Patient also tells me that he is under the care of Dr. Soriano for his heart and for some cardiac calcification. He denies any angina or the use of any nitrate medication. HIs HTN and cholesterol issues are stable on medicine, NKMA. \par  The patient denies fevers, chills, nausea and or vomiting and no unexplained weight loss. \par All pertinent parts of the patient PFSH (past medical, family and social histories), laboratory, radiological studies and physician notes were reviewed prior to starting the face to face portion of the telemedicine visit. Questionnaire results were discussed with patient.\par We discussed the patient's response to sildenafil citrate and I [facility]. We also discussed some other options which might enhance his sexual response and I recommended that he try tadalafil 5 mg daily. After reviewing the indications risks alternatives and chances for success on this medicine, at his request I did prescribe this for him through [facility] pharmacy mail-order. For now he will use these interchangeably. We discussed appropriate ways to take the medicine to optimize the effects.\par We also discussed his minimal lower urinary tract symptoms and the need for yearly prostatic screening including PSA and TRICE. Both of these were performed today. If the results of the PSA remains stable, then we will follow-up in 1 year. In the meanwhile if he finds that the above treatment for his ED is not successful, then we will revisit this sooner by means of [facility] visit.\par As for the mild and chronic tenderness above the right testis and apparent spermatocele, we will continue to follow this for any changes and consider scrotal ultrasound in the future. \par PSA  2.62\par \par Patient seen TODAY 6/16/2023 to reassess and reorder medications. He is urinating well with frequency in the evening on days that he drinks extra water. Otherwise, nocturia x 0 - 1 As for the ED, the tadalafil 5 g daily is about as effective as the sildenafil PRN but does give spontaneity. He alternated between these two medications. He requests refills. As for the right solitary testis and spermatocele, these are stable.\par UA negative\par IPSS 2\par HIRAL 1\par JOAQUIN 14\par PVR 50 ml.\par \par \par

## 2023-06-16 NOTE — PHYSICAL EXAM
[General Appearance - Well Developed] : well developed [General Appearance - Well Nourished] : well nourished [Normal Appearance] : normal appearance [Well Groomed] : well groomed [General Appearance - In No Acute Distress] : no acute distress [Abdomen Soft] : soft [Abdomen Tenderness] : non-tender [Abdomen Hernia] : no hernia was discovered [Costovertebral Angle Tenderness] : no ~M costovertebral angle tenderness [Urethral Meatus] : meatus normal [Penis Abnormality] : normal uncircumcised penis [Urinary Bladder Findings] : the bladder was normal on palpation [Scrotum] : the scrotum was normal [Testes Mass (___cm)] : there were no testicular masses [Prostate Tenderness] : the prostate was not tender [No Prostate Nodules] : no prostate nodules [Prostate Size ___ (0-4)] : prostate size [unfilled] (scale: 0-4) [FreeTextEntry1] : NOrmal right testis, stable 1 cm caput spermatocele with tenderness to palpation throughout the epididymis. [Edema] : no peripheral edema [] : no respiratory distress [Respiration, Rhythm And Depth] : normal respiratory rhythm and effort [Exaggerated Use Of Accessory Muscles For Inspiration] : no accessory muscle use [Normal Station and Gait] : the gait and station were normal for the patient's age [No Focal Deficits] : no focal deficits

## 2023-06-19 ENCOUNTER — TRANSCRIPTION ENCOUNTER (OUTPATIENT)
Age: 63
End: 2023-06-19

## 2023-06-19 ENCOUNTER — NON-APPOINTMENT (OUTPATIENT)
Age: 63
End: 2023-06-19

## 2023-06-19 LAB — PSA SERPL-MCNC: 2.76 NG/ML

## 2023-11-22 ENCOUNTER — APPOINTMENT (OUTPATIENT)
Dept: HEART AND VASCULAR | Facility: CLINIC | Age: 63
End: 2023-11-22

## 2023-11-27 ENCOUNTER — NON-APPOINTMENT (OUTPATIENT)
Age: 63
End: 2023-11-27

## 2023-11-27 ENCOUNTER — APPOINTMENT (OUTPATIENT)
Dept: HEART AND VASCULAR | Facility: CLINIC | Age: 63
End: 2023-11-27
Payer: COMMERCIAL

## 2023-11-27 VITALS
DIASTOLIC BLOOD PRESSURE: 74 MMHG | HEIGHT: 70 IN | WEIGHT: 184.13 LBS | BODY MASS INDEX: 26.36 KG/M2 | TEMPERATURE: 97.6 F | OXYGEN SATURATION: 98 % | HEART RATE: 62 BPM | SYSTOLIC BLOOD PRESSURE: 138 MMHG

## 2023-11-27 VITALS — SYSTOLIC BLOOD PRESSURE: 125 MMHG | DIASTOLIC BLOOD PRESSURE: 79 MMHG

## 2023-11-27 PROCEDURE — 93000 ELECTROCARDIOGRAM COMPLETE: CPT

## 2023-11-27 PROCEDURE — 99214 OFFICE O/P EST MOD 30 MIN: CPT | Mod: 25

## 2023-11-28 LAB
24R-OH-CALCIDIOL SERPL-MCNC: 68.6 PG/ML
ALBUMIN SERPL ELPH-MCNC: 4.7 G/DL
ALP BLD-CCNC: 73 U/L
ALT SERPL-CCNC: 19 U/L
ANION GAP SERPL CALC-SCNC: 10 MMOL/L
AST SERPL-CCNC: 26 U/L
BASOPHILS # BLD AUTO: 0.04 K/UL
BASOPHILS NFR BLD AUTO: 0.7 %
BILIRUB SERPL-MCNC: 0.6 MG/DL
BUN SERPL-MCNC: 16 MG/DL
CALCIUM SERPL-MCNC: 10.2 MG/DL
CHLORIDE SERPL-SCNC: 103 MMOL/L
CHOLEST SERPL-MCNC: 177 MG/DL
CO2 SERPL-SCNC: 27 MMOL/L
CREAT SERPL-MCNC: 0.97 MG/DL
EGFR: 88 ML/MIN/1.73M2
EOSINOPHIL # BLD AUTO: 0.12 K/UL
EOSINOPHIL NFR BLD AUTO: 2.1 %
ESTIMATED AVERAGE GLUCOSE: 105 MG/DL
GLUCOSE SERPL-MCNC: 105 MG/DL
HBA1C MFR BLD HPLC: 5.3 %
HCT VFR BLD CALC: 49.8 %
HDLC SERPL-MCNC: 60 MG/DL
HGB BLD-MCNC: 16.1 G/DL
IMM GRANULOCYTES NFR BLD AUTO: 0.2 %
LDLC SERPL DIRECT ASSAY-MCNC: 101 MG/DL
LYMPHOCYTES # BLD AUTO: 1.71 K/UL
LYMPHOCYTES NFR BLD AUTO: 30.6 %
MAN DIFF?: NORMAL
MCHC RBC-ENTMCNC: 31.1 PG
MCHC RBC-ENTMCNC: 32.3 GM/DL
MCV RBC AUTO: 96.1 FL
MONOCYTES # BLD AUTO: 0.49 K/UL
MONOCYTES NFR BLD AUTO: 8.8 %
NEUTROPHILS # BLD AUTO: 3.22 K/UL
NEUTROPHILS NFR BLD AUTO: 57.6 %
PLATELET # BLD AUTO: 176 K/UL
POTASSIUM SERPL-SCNC: 4.8 MMOL/L
PROT SERPL-MCNC: 7.5 G/DL
PSA SERPL-MCNC: 3.37 NG/ML
RBC # BLD: 5.18 M/UL
RBC # FLD: 13.7 %
SODIUM SERPL-SCNC: 140 MMOL/L
T3FREE SERPL-MCNC: 2.84 PG/ML
T4 FREE SERPL-MCNC: 1.3 NG/DL
TRIGL SERPL-MCNC: 67 MG/DL
TSH SERPL-ACNC: 2.32 UIU/ML
WBC # FLD AUTO: 5.59 K/UL

## 2023-12-05 ENCOUNTER — TRANSCRIPTION ENCOUNTER (OUTPATIENT)
Age: 63
End: 2023-12-05

## 2023-12-12 ENCOUNTER — RX RENEWAL (OUTPATIENT)
Age: 63
End: 2023-12-12

## 2024-06-18 PROBLEM — E78.5 DYSLIPIDEMIA: Status: ACTIVE | Noted: 2022-01-07

## 2024-06-18 PROBLEM — I65.29 CAROTID ATHEROSCLEROSIS: Status: ACTIVE | Noted: 2022-01-12

## 2024-06-18 PROBLEM — I10 HTN (HYPERTENSION): Status: ACTIVE | Noted: 2022-01-07

## 2024-06-18 PROBLEM — N50.811 PAIN IN RIGHT TESTICLE: Status: RESOLVED | Noted: 2022-06-02 | Resolved: 2024-06-18

## 2024-06-18 PROBLEM — N43.40 SPERMATOCELE: Status: RESOLVED | Noted: 2023-06-16 | Resolved: 2024-06-18

## 2024-06-19 ENCOUNTER — APPOINTMENT (OUTPATIENT)
Dept: HEART AND VASCULAR | Facility: CLINIC | Age: 64
End: 2024-06-19
Payer: COMMERCIAL

## 2024-06-19 ENCOUNTER — NON-APPOINTMENT (OUTPATIENT)
Age: 64
End: 2024-06-19

## 2024-06-19 VITALS
HEART RATE: 71 BPM | HEIGHT: 70 IN | DIASTOLIC BLOOD PRESSURE: 80 MMHG | WEIGHT: 169 LBS | SYSTOLIC BLOOD PRESSURE: 118 MMHG | BODY MASS INDEX: 24.2 KG/M2 | OXYGEN SATURATION: 97 %

## 2024-06-19 DIAGNOSIS — I10 ESSENTIAL (PRIMARY) HYPERTENSION: ICD-10-CM

## 2024-06-19 DIAGNOSIS — I65.29 OCCLUSION AND STENOSIS OF UNSPECIFIED CAROTID ARTERY: ICD-10-CM

## 2024-06-19 DIAGNOSIS — N50.811 RIGHT TESTICULAR PAIN: ICD-10-CM

## 2024-06-19 DIAGNOSIS — E78.5 HYPERLIPIDEMIA, UNSPECIFIED: ICD-10-CM

## 2024-06-19 DIAGNOSIS — N43.40 SPERMATOCELE OF EPIDIDYMIS, UNSPECIFIED: ICD-10-CM

## 2024-06-19 PROCEDURE — 93000 ELECTROCARDIOGRAM COMPLETE: CPT

## 2024-06-19 PROCEDURE — G2211 COMPLEX E/M VISIT ADD ON: CPT

## 2024-06-19 PROCEDURE — 99214 OFFICE O/P EST MOD 30 MIN: CPT

## 2024-06-19 RX ORDER — RAMIPRIL 5 MG/1
5 CAPSULE ORAL
Qty: 90 | Refills: 3 | Status: ACTIVE | COMMUNITY
Start: 1900-01-01 | End: 1900-01-01

## 2024-06-19 RX ORDER — TADALAFIL 5 MG/1
5 TABLET ORAL
Qty: 90 | Refills: 3 | Status: ACTIVE | COMMUNITY
Start: 2022-06-02 | End: 1900-01-01

## 2024-06-19 RX ORDER — ATORVASTATIN CALCIUM 40 MG/1
40 TABLET, FILM COATED ORAL
Qty: 90 | Refills: 3 | Status: ACTIVE | COMMUNITY
Start: 1900-01-01 | End: 1900-01-01

## 2024-06-19 NOTE — HISTORY OF PRESENT ILLNESS
[FreeTextEntry1] : Mr. Villegas presents for follow up and management of HTN, erectile dysfunction, and dyslipidemia.  He was previously followed by another cardiologist, Kathy Soriano MD who retired.  He has a family history of abdominal aortic aneurysm.  He has been getting regular screening sonograms of his carotid arteries, lower extremity peripheral arteries, and abdominal aorta with his prior cardiologist. On 02/02/22 he had an echocardiogram which revealed an EF of 68% and was a normal study.  On, 12/02/22, he emailed to inform me that he will be flying to [facility] urgently to attend to his mother's illness. She is 92 years old but has progressive dementia and is now bedridden with progressive deterioration.  At present, he feels well with no new cardiopulmonary complaints. He denies chest pain, shortness of breath, lower extremity edema or PND.

## 2024-06-19 NOTE — PHYSICAL EXAM

## 2024-06-19 NOTE — REASON FOR VISIT
[Other: ____] : [unfilled] [FreeTextEntry1] : ======================================================================================= Diagnostic Tests: -------------------------------------------------------------- EC24: sinus rhythm, normal ECG. 23: sinus rhythm, normal ECG.  22: sinus rhythm, normal ECG.  22: sinus bradycardia at 58 bpm, otherwise normal.   -------------------------------------------------------------- Echo: 22: EF 68%, normal study.  01/15/18: EF 64%, grade I diastolic dysfunction.  -------------------------------------------------------------- Monitors: 17: Holter: HR 46/64/103, first degree AV block, no pauses, no AF, 6 beats SVT, rare APCs/VPCs.   -------------------------------------------------------------- Carotid arteries: 22: sono: 1-19% b/l prox ICA stenosis.  17: sono: normal study.   06/10/16: sono: mild atherosclerosis. -------------------------------------------------------------- Abdominal aorta - Iliac arteries: 17: sono: normal study.  06/10/16: sono: no AAA, mild atherosclerosis.  -------------------------------------------------------------- Sleep studies: 17: no sleep apnea.   -------------------------------------------------------------- Lower extremity arteries: 22: sono: normal bilateral lower extremity arterial system.

## 2024-06-19 NOTE — ASSESSMENT
[FreeTextEntry1] : ======================================================================================= 1. HTN: BP at ACC/AHA 2017 guideline target: has been taking ramipril 5mg qhs     - continue ramipril 5mg po q am  2. Dyslipidemia: LDL92 (04/19/23):      - continue atorvastatin 40mg po qd     - discussed therapeutic lifestyle changes to promote improved lipid metabolism     - check lab work today  3. Carotid artery atherosclerosis: mild bilateral ICA atherosclerosis:     - will pursue medical management

## 2024-06-20 LAB
ALBUMIN SERPL ELPH-MCNC: 5 G/DL
ALP BLD-CCNC: 57 U/L
ALT SERPL-CCNC: 21 U/L
ANION GAP SERPL CALC-SCNC: 15 MMOL/L
AST SERPL-CCNC: 34 U/L
BASOPHILS # BLD AUTO: 0.06 K/UL
BASOPHILS NFR BLD AUTO: 0.9 %
BILIRUB SERPL-MCNC: 0.8 MG/DL
BUN SERPL-MCNC: 12 MG/DL
CALCIUM SERPL-MCNC: 10 MG/DL
CHLORIDE SERPL-SCNC: 100 MMOL/L
CHOLEST SERPL-MCNC: 166 MG/DL
CO2 SERPL-SCNC: 24 MMOL/L
CREAT SERPL-MCNC: 0.94 MG/DL
CRP SERPL HS-MCNC: 0.2 MG/L
EGFR: 91 ML/MIN/1.73M2
EOSINOPHIL # BLD AUTO: 0.22 K/UL
EOSINOPHIL NFR BLD AUTO: 3.2 %
ESTIMATED AVERAGE GLUCOSE: 103 MG/DL
GLUCOSE SERPL-MCNC: 84 MG/DL
HBA1C MFR BLD HPLC: 5.2 %
HCT VFR BLD CALC: 46.6 %
HDLC SERPL-MCNC: 81 MG/DL
HGB BLD-MCNC: 15.3 G/DL
IMM GRANULOCYTES NFR BLD AUTO: 0.1 %
LDLC SERPL DIRECT ASSAY-MCNC: 74 MG/DL
LYMPHOCYTES # BLD AUTO: 2.17 K/UL
LYMPHOCYTES NFR BLD AUTO: 31.7 %
MAN DIFF?: NORMAL
MCHC RBC-ENTMCNC: 31.9 PG
MCHC RBC-ENTMCNC: 32.8 GM/DL
MCV RBC AUTO: 97.1 FL
MONOCYTES # BLD AUTO: 0.58 K/UL
MONOCYTES NFR BLD AUTO: 8.5 %
NEUTROPHILS # BLD AUTO: 3.8 K/UL
NEUTROPHILS NFR BLD AUTO: 55.6 %
PLATELET # BLD AUTO: 203 K/UL
POTASSIUM SERPL-SCNC: 4.7 MMOL/L
PROT SERPL-MCNC: 7.8 G/DL
PSA FREE FLD-MCNC: 30 %
PSA FREE SERPL-MCNC: 0.73 NG/ML
PSA SERPL-MCNC: 2.47 NG/ML
RBC # BLD: 4.8 M/UL
RBC # FLD: 14.4 %
SODIUM SERPL-SCNC: 139 MMOL/L
TRIGL SERPL-MCNC: 41 MG/DL
TSH SERPL-ACNC: 2.1 UIU/ML
WBC # FLD AUTO: 6.84 K/UL

## 2024-06-20 RX ORDER — SILDENAFIL 100 MG/1
100 TABLET, FILM COATED ORAL
Qty: 90 | Refills: 1 | Status: ACTIVE | COMMUNITY
Start: 2020-02-24 | End: 1900-01-01

## 2024-10-18 ENCOUNTER — NON-APPOINTMENT (OUTPATIENT)
Age: 64
End: 2024-10-18

## 2025-01-03 ENCOUNTER — APPOINTMENT (OUTPATIENT)
Dept: UROLOGY | Facility: CLINIC | Age: 65
End: 2025-01-03
Payer: COMMERCIAL

## 2025-01-03 VITALS
HEART RATE: 72 BPM | SYSTOLIC BLOOD PRESSURE: 123 MMHG | BODY MASS INDEX: 24.05 KG/M2 | TEMPERATURE: 96.6 F | DIASTOLIC BLOOD PRESSURE: 83 MMHG | WEIGHT: 168 LBS | OXYGEN SATURATION: 99 % | HEIGHT: 70 IN

## 2025-01-03 DIAGNOSIS — N43.40 SPERMATOCELE OF EPIDIDYMIS, UNSPECIFIED: ICD-10-CM

## 2025-01-03 DIAGNOSIS — N52.01 ERECTILE DYSFUNCTION DUE TO ARTERIAL INSUFFICIENCY: ICD-10-CM

## 2025-01-03 LAB
BILIRUB UR QL STRIP: NORMAL
CLARITY UR: CLEAR
COLLECTION METHOD: NORMAL
GLUCOSE UR-MCNC: NORMAL
HCG UR QL: 0.2 EU/DL
HGB UR QL STRIP.AUTO: NORMAL
KETONES UR-MCNC: NORMAL
LEUKOCYTE ESTERASE UR QL STRIP: NORMAL
NITRITE UR QL STRIP: NORMAL
PH UR STRIP: 5.5
PROT UR STRIP-MCNC: NORMAL
SP GR UR STRIP: 1.02

## 2025-01-03 PROCEDURE — 51798 US URINE CAPACITY MEASURE: CPT

## 2025-01-03 PROCEDURE — 99214 OFFICE O/P EST MOD 30 MIN: CPT

## 2025-01-03 PROCEDURE — 81003 URINALYSIS AUTO W/O SCOPE: CPT | Mod: QW

## 2025-01-09 ENCOUNTER — NON-APPOINTMENT (OUTPATIENT)
Age: 65
End: 2025-01-09

## 2025-01-09 DIAGNOSIS — N40.1 BENIGN PROSTATIC HYPERPLASIA WITH LOWER URINARY TRACT SYMPMS: ICD-10-CM

## 2025-01-09 DIAGNOSIS — N13.8 BENIGN PROSTATIC HYPERPLASIA WITH LOWER URINARY TRACT SYMPMS: ICD-10-CM

## 2025-01-09 LAB — PSA SERPL-MCNC: 3.39 NG/ML

## 2025-01-15 ENCOUNTER — APPOINTMENT (OUTPATIENT)
Dept: HEART AND VASCULAR | Facility: CLINIC | Age: 65
End: 2025-01-15
Payer: COMMERCIAL

## 2025-01-15 ENCOUNTER — NON-APPOINTMENT (OUTPATIENT)
Age: 65
End: 2025-01-15

## 2025-01-15 VITALS
BODY MASS INDEX: 25.77 KG/M2 | DIASTOLIC BLOOD PRESSURE: 71 MMHG | HEIGHT: 70 IN | WEIGHT: 180 LBS | OXYGEN SATURATION: 98 % | SYSTOLIC BLOOD PRESSURE: 115 MMHG | HEART RATE: 59 BPM

## 2025-01-15 DIAGNOSIS — E78.5 HYPERLIPIDEMIA, UNSPECIFIED: ICD-10-CM

## 2025-01-15 DIAGNOSIS — I65.29 OCCLUSION AND STENOSIS OF UNSPECIFIED CAROTID ARTERY: ICD-10-CM

## 2025-01-15 DIAGNOSIS — I10 ESSENTIAL (PRIMARY) HYPERTENSION: ICD-10-CM

## 2025-01-15 PROCEDURE — 99214 OFFICE O/P EST MOD 30 MIN: CPT

## 2025-01-15 PROCEDURE — 93000 ELECTROCARDIOGRAM COMPLETE: CPT

## 2025-01-15 PROCEDURE — G2211 COMPLEX E/M VISIT ADD ON: CPT

## 2025-01-16 LAB
24R-OH-CALCIDIOL SERPL-MCNC: 77.5 PG/ML
ALBUMIN SERPL ELPH-MCNC: 4.3 G/DL
ALP BLD-CCNC: 62 U/L
ALT SERPL-CCNC: 14 U/L
ANION GAP SERPL CALC-SCNC: 15 MMOL/L
AST SERPL-CCNC: 21 U/L
BASOPHILS # BLD AUTO: 0.05 K/UL
BASOPHILS NFR BLD AUTO: 0.8 %
BILIRUB SERPL-MCNC: 0.4 MG/DL
BUN SERPL-MCNC: 14 MG/DL
CALCIUM SERPL-MCNC: 9.9 MG/DL
CHLORIDE SERPL-SCNC: 104 MMOL/L
CHOLEST SERPL-MCNC: 160 MG/DL
CO2 SERPL-SCNC: 25 MMOL/L
CREAT SERPL-MCNC: 0.86 MG/DL
EGFR: 97 ML/MIN/1.73M2
EOSINOPHIL # BLD AUTO: 0.4 K/UL
EOSINOPHIL NFR BLD AUTO: 6.4 %
ESTIMATED AVERAGE GLUCOSE: 105 MG/DL
FOLATE RBC-MCNC: 1013 NG/ML
GLUCOSE SERPL-MCNC: 73 MG/DL
HBA1C MFR BLD HPLC: 5.3 %
HCT VFR BLD CALC: 46.4 %
HCT VFR BLD CALC: 46.9 %
HDLC SERPL-MCNC: 68 MG/DL
HGB BLD-MCNC: 14.7 G/DL
IMM GRANULOCYTES NFR BLD AUTO: 0.2 %
IRON SATN MFR SERPL: 28 %
IRON SERPL-MCNC: 76 UG/DL
LDLC SERPL DIRECT ASSAY-MCNC: 82 MG/DL
LYMPHOCYTES # BLD AUTO: 1.93 K/UL
LYMPHOCYTES NFR BLD AUTO: 30.8 %
MAN DIFF?: NORMAL
MCHC RBC-ENTMCNC: 31.2 PG
MCHC RBC-ENTMCNC: 31.7 G/DL
MCV RBC AUTO: 98.5 FL
MONOCYTES # BLD AUTO: 0.46 K/UL
MONOCYTES NFR BLD AUTO: 7.3 %
NEUTROPHILS # BLD AUTO: 3.42 K/UL
NEUTROPHILS NFR BLD AUTO: 54.5 %
PLATELET # BLD AUTO: 193 K/UL
POTASSIUM SERPL-SCNC: 5.1 MMOL/L
PROT SERPL-MCNC: 7.2 G/DL
RBC # BLD: 4.71 M/UL
RBC # FLD: 15.2 %
SODIUM SERPL-SCNC: 144 MMOL/L
TIBC SERPL-MCNC: 271 UG/DL
TRIGL SERPL-MCNC: 51 MG/DL
TSH SERPL-ACNC: 2.32 UIU/ML
UIBC SERPL-MCNC: 195 UG/DL
WBC # FLD AUTO: 6.27 K/UL

## 2025-05-07 ENCOUNTER — APPOINTMENT (OUTPATIENT)
Dept: HEART AND VASCULAR | Facility: CLINIC | Age: 65
End: 2025-05-07
Payer: COMMERCIAL

## 2025-05-07 VITALS
TEMPERATURE: 97.2 F | HEIGHT: 70 IN | HEART RATE: 60 BPM | WEIGHT: 174 LBS | OXYGEN SATURATION: 97 % | SYSTOLIC BLOOD PRESSURE: 128 MMHG | BODY MASS INDEX: 24.91 KG/M2 | DIASTOLIC BLOOD PRESSURE: 71 MMHG

## 2025-05-07 DIAGNOSIS — I10 ESSENTIAL (PRIMARY) HYPERTENSION: ICD-10-CM

## 2025-05-07 DIAGNOSIS — I65.29 OCCLUSION AND STENOSIS OF UNSPECIFIED CAROTID ARTERY: ICD-10-CM

## 2025-05-07 DIAGNOSIS — E78.5 HYPERLIPIDEMIA, UNSPECIFIED: ICD-10-CM

## 2025-05-07 PROCEDURE — 99214 OFFICE O/P EST MOD 30 MIN: CPT

## 2025-05-07 PROCEDURE — G2211 COMPLEX E/M VISIT ADD ON: CPT

## 2025-05-21 ENCOUNTER — NON-APPOINTMENT (OUTPATIENT)
Age: 65
End: 2025-05-21